# Patient Record
Sex: FEMALE
[De-identification: names, ages, dates, MRNs, and addresses within clinical notes are randomized per-mention and may not be internally consistent; named-entity substitution may affect disease eponyms.]

---

## 2018-07-11 ENCOUNTER — HOSPITAL ENCOUNTER (EMERGENCY)
Dept: HOSPITAL 31 - C.ER | Age: 23
Discharge: HOME | End: 2018-07-11
Payer: COMMERCIAL

## 2018-07-11 VITALS
TEMPERATURE: 98.1 F | HEART RATE: 78 BPM | SYSTOLIC BLOOD PRESSURE: 110 MMHG | OXYGEN SATURATION: 100 % | DIASTOLIC BLOOD PRESSURE: 76 MMHG

## 2018-07-11 VITALS — RESPIRATION RATE: 20 BRPM

## 2018-07-11 DIAGNOSIS — F41.0: Primary | ICD-10-CM

## 2018-07-11 LAB
BILIRUB UR-MCNC: NEGATIVE MG/DL
GLUCOSE UR STRIP-MCNC: NORMAL MG/DL
HCG,QUALITATIVE URINE: NEGATIVE
LEUKOCYTE ESTERASE UR-ACNC: (no result) LEU/UL
PH UR STRIP: 7 [PH] (ref 5–8)
PROT UR STRIP-MCNC: (no result) MG/DL
RBC # UR STRIP: NEGATIVE /UL
SP GR UR STRIP: 1.02 (ref 1–1.03)
SQUAMOUS EPITHIAL: 4 /HPF (ref 0–5)
UROBILINOGEN UR-MCNC: 2 MG/DL (ref 0.2–1)

## 2018-07-11 NOTE — C.PDOC
History Of Present Illness


22 y/o female with history of anxiety presents to ED for evaluation of panic 

attack gradually developed this morning, while at school. Patient reports 

symptoms lasted 1 hour and describes as " my head was feeling up, developed 

chest tightness with palpitations". Patient reports similar symptoms in the 

past but "not as severe" . Pt admits, currently sx moderate improved. Otherwise

, Patient denies  fever,m chills, recent illness, headache, dizziness, vertigo, 

visual changes, focal deficits, neck pain, sob, dyspnea, diaphoresis, abd. pain

, nausea, vomiting, diarrhea, UTI sx, denies drug use. Ambulate to Ed for 

evaluation, appears comfortable, not in any apparent distress.





Time Seen by Provider: 07/11/18 13:44


Chief Complaint (Nursing): Anxiety


History Per: Patient


History/Exam Limitations: no limitations


Onset/Duration Of Symptoms: Hrs


Current Symptoms Are (Timing): Still Present


Suicide/Self Injury Attempted (Context): None





Past Medical History


Reviewed: Historical Data, Nursing Documentation, Vital Signs


Vital Signs: 


 Last Vital Signs











Temp  98.7 F   07/11/18 13:29


 


Pulse  82   07/11/18 13:29


 


Resp  20   07/11/18 13:29


 


BP  97/62 L  07/11/18 13:29


 


Pulse Ox  98   07/11/18 14:59














- Medical History


PMH: Anxiety


Surgical History: No Surg Hx


Family History: States: No Known Family Hx





- Social History


Hx Tobacco Use: No


Hx Alcohol Use: No


Hx Substance Use: No





- Immunization History


Hx Tetanus Toxoid Vaccination: No


Hx Influenza Vaccination: No


Hx Pneumococcal Vaccination: No





Review Of Systems


Except As Marked, All Systems Reviewed And Found Negative.


Constitutional: Negative for: Fever, Chills


Eyes: Negative for: Vision Change


ENT: Negative for: Ear Discharge, Throat Swelling


Cardiovascular: Positive for: Chest Pain, Palpitations.  Negative for: Light 

Headedness


Respiratory: Negative for: Cough, Shortness of Breath


Gastrointestinal: Negative for: Nausea, Vomiting, Abdominal Pain, Diarrhea


Genitourinary: Negative for: Dysuria


Skin: Negative for: Rash


Neurological: Negative for: Weakness, Numbness, Altered Mental Status, Headache

, Dizziness





Physical Exam





- Physical Exam


Appears: Well, Non-toxic, No Acute Distress


Skin: Warm, Dry, No Rash


Head: Normacephalic


Eye(s): bilateral: PERRL


Ear(s): Bilateral: Normal


Nose: No Flaring, No Discharge


Oral Mucosa: Moist, No Drooling


Tongue: Normal Appearing


Lips: Normal Appearing


Throat: No Erythema, No Drooling


Neck: Trachea Midline, Supple


Cardiovascular: Rhythm Regular, No Murmur, No JVD, Other ((-) carotid bruits B/L

)


Respiratory: No Accessory Muscle Use, No Rales, No Rhonchi, No Stridor, No 

Wheezing


Gastrointestinal/Abdominal: Soft, No Tenderness, No Distention, No Guarding, No 

Rebound


Back: No CVA Tenderness


Extremity: Normal ROM, No Pedal Edema, No Deformity, No Swelling


Neurological/Psych: Oriented x3, Normal Speech, Normal Cognition





ED Course And Treatment





- Laboratory Results


Urine Pregnancy POC: Negative


ECG: Interpreted By Me, Viewed By Me


ECG Interpretation: Normal


O2 Sat by Pulse Oximetry: 98 (RA)


Pulse Ox Interpretation: Normal





- Radiology


CXR: Interpreted by Me, Viewed By Me, Read By Radiologist


CXR Interpretation: Yes: No Acute Disease


Progress Note: On re-evaluation, pt is asymptomatic now.  Pt is afebrile, 

hemodynamicaly stable.  Non-toxic.  PulseOx 98% RA.  ENT: no acute findings.  

neck: SUpple, (-) JVD.  Lungs: CTA B/L, BS equal B/L.  CVS: (+)S1S2, reg.  Abd: 

benign, (-) guarding, (-) rebound.  back: (-) CVA tenderness.  Neurologicaly 

intact.  CXR, EKG review- normal study.  UA review- no acute changes.  Pt has 

clinical findings c/w anxiety, panic attack.  Pt advised on course of ds.  ref. 

to f/u with PMD in 2-3 days for re-evaluation.





Disposition


Counseled Patient/Family Regarding: Studies Performed, Diagnosis, Need For 

Followup, Rx Given, Smoking Cessation





- Disposition


Referrals: 


[facility] [Outside]


James Piña MD [Staff Provider] - 


Disposition: HOME/ ROUTINE


Disposition Time: 14:51


Condition: STABLE


Additional Instructions: 


Follow up with PMD, Psychiatrist in 2-3 days for re-evaluation.


return to Ed if any worsening or new changes.


Prescriptions: 


DiphenhydrAMINE [Benadryl] 25 mg PO BID #20 cap


Instructions:  Panic Disorder (DC)


Forms:  [facility] (English)





- Clinical Impression


Clinical Impression: 


 Panic attack








- PA / NP / Resident Statement


MD/DO has reviewed & agrees with the documentation as recorded.





- Scribe Statement


The provider has reviewed the documentation as recorded by the Trayibmona Lopez





All medical record entries made by the Art were at my direction and 

personally dictated by me. I have reviewed the chart and agree that the record 

accurately reflects my personal performance of the history, physical exam, 

medical decision making, and the department course for this patient. I have 

also personally directed, reviewed, and agree with the discharge instructions 

and disposition.

## 2018-07-13 NOTE — CARD
--------------- APPROVED REPORT --------------





Date of service: 07/11/2018



EKG Measurement

Heart [id number]

ND [id number]

[id number]

[id number]

[id number]



<Conclusion>

Normal sinus rhythm with sinus arrhythmia

Normal ECG

## 2018-08-21 ENCOUNTER — HOSPITAL ENCOUNTER (EMERGENCY)
Dept: HOSPITAL 31 - C.ER | Age: 23
Discharge: HOME | End: 2018-08-21
Payer: COMMERCIAL

## 2018-08-21 VITALS — HEART RATE: 67 BPM

## 2018-08-21 VITALS — DIASTOLIC BLOOD PRESSURE: 58 MMHG | RESPIRATION RATE: 20 BRPM | SYSTOLIC BLOOD PRESSURE: 112 MMHG | TEMPERATURE: 98.7 F

## 2018-08-21 DIAGNOSIS — O20.0: Primary | ICD-10-CM

## 2018-08-21 DIAGNOSIS — Z3A.08: ICD-10-CM

## 2018-08-21 DIAGNOSIS — O23.41: ICD-10-CM

## 2018-08-21 LAB
ALBUMIN SERPL-MCNC: 4.8 G/DL (ref 3.5–5)
ALBUMIN/GLOB SERPL: 1.8 {RATIO} (ref 1–2.1)
ALT SERPL-CCNC: 22 U/L (ref 9–52)
AST SERPL-CCNC: 20 U/L (ref 14–36)
BACTERIA #/AREA URNS HPF: (no result) /[HPF]
BASOPHILS # BLD AUTO: 0 K/UL (ref 0–0.2)
BASOPHILS NFR BLD: 0.5 % (ref 0–2)
BILIRUB UR-MCNC: NEGATIVE MG/DL
BUN SERPL-MCNC: 9 MG/DL (ref 7–17)
CALCIUM SERPL-MCNC: 9.8 MG/DL (ref 8.6–10.4)
COLOR UR: YELLOW
EOSINOPHIL # BLD AUTO: 0.1 K/UL (ref 0–0.7)
EOSINOPHIL NFR BLD: 0.7 % (ref 0–4)
ERYTHROCYTE [DISTWIDTH] IN BLOOD BY AUTOMATED COUNT: 12.2 % (ref 11.5–14.5)
GFR NON-AFRICAN AMERICAN: > 60
GLUCOSE UR STRIP-MCNC: NEGATIVE MG/DL
HGB BLD-MCNC: 14.2 G/DL (ref 11–16)
LEUKOCYTE ESTERASE UR-ACNC: NEGATIVE LEU/UL
LIPASE: 90 U/L (ref 23–300)
LYMPHOCYTES # BLD AUTO: 1.6 K/UL (ref 1–4.3)
LYMPHOCYTES NFR BLD AUTO: 18.6 % (ref 20–40)
MCH RBC QN AUTO: 31.2 PG (ref 27–31)
MCHC RBC AUTO-ENTMCNC: 36 G/DL (ref 33–37)
MCV RBC AUTO: 86.8 FL (ref 81–99)
MONOCYTES # BLD: 0.4 K/UL (ref 0–0.8)
MONOCYTES NFR BLD: 5.3 % (ref 0–10)
NEUTROPHILS # BLD: 6.4 K/UL (ref 1.8–7)
NEUTROPHILS NFR BLD AUTO: 74.9 % (ref 50–75)
NRBC BLD AUTO-RTO: 0 % (ref 0–2)
PH UR STRIP: 8 [PH] (ref 5–8)
PLATELET # BLD: 244 K/UL (ref 130–400)
PMV BLD AUTO: 7.8 FL (ref 7.2–11.7)
PROT UR STRIP-MCNC: (no result) MG/DL
RBC # BLD AUTO: 4.55 MIL/UL (ref 3.8–5.2)
RBC # UR STRIP: (no result) /UL
SP GR UR STRIP: 1.01 (ref 1–1.03)
SQUAMOUS EPITHIAL: 6 /HPF (ref 0–5)
UROBILINOGEN UR-MCNC: 0.2 MG/DL (ref 0.2–1)
WBC # BLD AUTO: 8.5 K/UL (ref 4.8–10.8)

## 2018-08-21 PROCEDURE — 83690 ASSAY OF LIPASE: CPT

## 2018-08-21 PROCEDURE — 87086 URINE CULTURE/COLONY COUNT: CPT

## 2018-08-21 PROCEDURE — 99285 EMERGENCY DEPT VISIT HI MDM: CPT

## 2018-08-21 PROCEDURE — 85025 COMPLETE CBC W/AUTO DIFF WBC: CPT

## 2018-08-21 PROCEDURE — 84702 CHORIONIC GONADOTROPIN TEST: CPT

## 2018-08-21 PROCEDURE — 96361 HYDRATE IV INFUSION ADD-ON: CPT

## 2018-08-21 PROCEDURE — 96365 THER/PROPH/DIAG IV INF INIT: CPT

## 2018-08-21 PROCEDURE — 76817 TRANSVAGINAL US OBSTETRIC: CPT

## 2018-08-21 PROCEDURE — 80053 COMPREHEN METABOLIC PANEL: CPT

## 2018-08-21 PROCEDURE — 81001 URINALYSIS AUTO W/SCOPE: CPT

## 2018-08-21 NOTE — US
Pelvic ultrasound 



History: Pregnancy. Pain. 



Comparison: None available. 



Technique: Real-time sonography was performed through the pelvis 

utilizing transvaginal technique. 



Findings: 



Uterus: 9.1 x 4.8 x 6.7 centimeters.  Heterogeneous echotexture.  

Anteverted. 



Cervix measures 3.7 centimeters. 



Intrauterine gestational sac measuring 2.88 centimeters corresponding 

to a gestational age of 7 weeks 5 days. 



Yolk sac measures 3.1 millimeters. 



Crown-rump length measures 1.6 centimeters corresponding to a 

gestational age of 8 weeks 0 days. 



Fetal heart rate of 173 beats per minute. 



No free fluid in the pelvic cul-de-sac. 



Right ovary: 4.0 x 2.1 x 3.2 centimeters. Normal flow. 



Left ovary: 3.7 x 1.4 x 2.0 centimeters. Normal flow. 



Impression: 



Intrauterine pregnancy corresponding to a gestational age of 

approximately 8 weeks and 0 days by crown-rump length of 1.6 

centimeters.  Fetal heart rate of 173 beats per minute. 



Limited 1st trimester ultrasound for viability purposes only.  

Continued interval followup with serial ultrasound, serial HCG levels,

 and gynecological consultation would be helpful if clinically 

indicated.

## 2018-08-21 NOTE — C.PDOC
History Of Present Illness





22 y/o female patient () presents to the ER with c/o lower suprapubic pain 

for the past two weeks. Pain is described as stabbing and intermittent. Patient 

is also experiencing nausea and yellow non-bloody emesis for the past two days. 

She reports being 6 weeks pregnant; LMP 18. Patient denies having a 

sonogram completed by her OB/GYN and has a scheduled follow up with her OBGYN 

on 18. Patient has not followed up with PCP. She denies fever,chills, 

diarrhea, dysuria, vaginal bleeding or any other associated symptoms. 


Time Seen by Provider: 18 11:43


Chief Complaint (Nursing): Female Genitourinary


History Per: Patient


History/Exam Limitations: no limitations


Onset/Duration Of Symptoms: Days, Intermittent Episodes


Current Symptoms Are (Timing): Still Present


Severity: Mild


Pain Scale Rating Of: 3


Location Of Pain/Discomfort: Suprapubic


Radiation Of Pain To:: None


Quality Of Discomfort: Stabbing, "Pain"


Associated Symptoms: Nausea, Vomiting.  denies: Fever, Chills, Diarrhea


Recent travel outside of the [place]: No


Last Menstral Period: 18


: 2


Para: 0


Miscarriage: 1





Past Medical History


Reviewed: Historical Data, Nursing Documentation, Vital Signs


Vital Signs: 


 Last Vital Signs











Temp  98.7 F   18 17:56


 


Pulse  67   18 17:56


 


Resp  20   18 17:56


 


BP  112/58 L  18 17:56


 


Pulse Ox  99   18 17:56














- Medical History


PMH: Anxiety


Surgical History: No Surg Hx


Family History: States: No Known Family Hx





- Social History


Hx Tobacco Use: No


Hx Alcohol Use: No


Hx Substance Use: No





- Immunization History


Hx Tetanus Toxoid Vaccination: No


Hx Influenza Vaccination: No


Hx Pneumococcal Vaccination: No





Review Of Systems


Constitutional: Negative for: Fever, Chills


Gastrointestinal: Positive for: Nausea, Vomiting (Yellow emesis), Abdominal 

Pain (Suprapubic ).  Negative for: Diarrhea


Genitourinary: Negative for: Dysuria, Vaginal Discharge, Vaginal Bleeding





Physical Exam





- Physical Exam


Appears: Non-toxic, No Acute Distress


Skin: Warm, Dry, No Rash


Head: Atraumatic, Normacephalic


Eye(s): bilateral: PERRL, EOMI


Oral Mucosa: Moist


Neck: Supple


Chest: No Tenderness


Cardiovascular: Rhythm Regular, No Murmur


Respiratory: Normal Breath Sounds, No Rales, No Rhonchi, Other (Clear to 

auscultation bilaterally.)


Gastrointestinal/Abdominal: Soft, Tenderness (mild suprapubic tenderness), No 

Distention, Other (Normoactive bowel sounds)


Back: No CVA Tenderness


Extremity: Normal ROM, No Calf Tenderness, No Swelling


Neurological/Psych: Oriented x3, Other (Alert, no gross focal deficit.)





ED Course And Treatment





- Laboratory Results


Result Diagrams: 


 18 12:01





 18 12:01


O2 Sat by Pulse Oximetry: 98 (RA)


Pulse Ox Interpretation: Normal





Medical Decision Making


Medical Decision Making: 








Impression: 23 year old with suprabic abdominal pain associated with 2 days of 

emesis.


Differential Diagnosis included but are not limited to: ectopic pregnancy vs. 

abdominal pain


Plan:


US Transvaginal 


UA


Urine Culture


Bloodwork





Reassess and disposition


Discussed w/ OBGYN on-call Dr. Lynn, recommends giving 2gm ceftriaxone IV and 

discharging patient on PO antibiotics. 





Disposition


Counseled Patient/Family Regarding: Studies Performed, Diagnosis, Need For 

Followup, Rx Given





- Disposition


Referrals: 


[facility] [Outside]


[facility] [Outside]


[facility] [Outside]


Disposition: HOME/ ROUTINE


Disposition Time: 17:34


Condition: GOOD


Additional Instructions: 


Please drink increased fluids. Please take antibiotics until completed.  FOllow 

up with your gynecologist in next 1-2 days. Pelvic rest- nothing in vagina.  

Return to ER for any worse abdominal pain, vaginal bleeding or any other 

concerns. 


Prescriptions: 


Nitrofurantoin Macrocrystals [Macrobid] 100 mg PO BID #14 cap


Instructions:  Urinary Tract Infection, Adult (DC), Threatened Miscarriage (DC)


Forms:  [facility] (English), General Discharge Instructions





- Clinical Impression


Clinical Impression: 


 Threatened , UTI (urinary tract infection)








- PA / NP / Resident Statement


MD/DO has reviewed & agrees with the documentation as recorded.





- Scribe Statement


The provider has reviewed the documentation as recorded by the Scribe (Malick Pride)








All medical record entries made by the Scribe were at my direction and 

personally dictated by me. I have reviewed the chart and agree that the record 

accurately reflects my personal performance of the history, physical exam, 

medical decision making, and the department course for this patient. I have 

also personally directed, reviewed, and agree with the discharge instructions 

and disposition.

## 2018-08-23 VITALS — OXYGEN SATURATION: 98 %

## 2018-12-14 ENCOUNTER — HOSPITAL ENCOUNTER (EMERGENCY)
Dept: HOSPITAL 31 - C.ER | Age: 23
Discharge: HOME | End: 2018-12-14
Payer: MEDICAID

## 2018-12-14 VITALS
RESPIRATION RATE: 20 BRPM | TEMPERATURE: 98.2 F | DIASTOLIC BLOOD PRESSURE: 60 MMHG | SYSTOLIC BLOOD PRESSURE: 100 MMHG | HEART RATE: 68 BPM

## 2018-12-14 VITALS — OXYGEN SATURATION: 98 %

## 2018-12-14 VITALS — BODY MASS INDEX: 22.3 KG/M2

## 2018-12-14 DIAGNOSIS — Z3A.00: ICD-10-CM

## 2018-12-14 DIAGNOSIS — O26.891: Primary | ICD-10-CM

## 2018-12-14 DIAGNOSIS — R05: ICD-10-CM

## 2018-12-14 NOTE — C.PDOC
History Of Present Illness


23 year old female presents to the ED for evaluation of dry cough which began 

around one week ago. She also reports associated chest pain when she coughs. 

Patient has not taken any medication for her symptoms. She denies fever, chills,

shortness of breath. 


Time Seen by Provider: 12/14/18 17:34


Chief Complaint (Nursing): Chest Pain


History Per: Patient


History/Exam Limitations: no limitations


Onset/Duration Of Symptoms: Other (one week )


Current Symptoms Are (Timing): Still Present


Quality: "Pain"


Additional History Per: Patient





Past Medical History


Reviewed: Historical Data, Nursing Documentation, Vital Signs


Vital Signs: 





                                Last Vital Signs











Temp  98.6 F   12/14/18 17:25


 


Pulse  80   12/14/18 17:25


 


Resp  18   12/14/18 17:25


 


BP  115/79   12/14/18 17:25


 


Pulse Ox  98   12/14/18 17:25














- Medical History


PMH: Anxiety


Surgical History: No Surg Hx


Family History: States: Unknown Family Hx





- Social History


Hx Tobacco Use: No


Hx Alcohol Use: No


Hx Substance Use: No





- Immunization History


Hx Tetanus Toxoid Vaccination: No


Hx Influenza Vaccination: No


Hx Pneumococcal Vaccination: No





Review Of Systems


Constitutional: Negative for: Fever, Chills


Cardiovascular: Positive for: Chest Pain


Respiratory: Positive for: Cough.  Negative for: Shortness of Breath, Sputum





Physical Exam





- Physical Exam


Appears: Non-toxic, No Acute Distress


Skin: Normal Color, Warm, Dry


Head: Atraumatic, Normacephalic


Eye(s): bilateral: Normal Inspection


Ear(s): Bilateral: Normal


Nose: Normal, No Discharge


Oral Mucosa: Moist


Throat: Normal, No Erythema, No Exudate


Neck: Supple


Chest: Symmetrical, No Deformity, No Tenderness


Cardiovascular: Rhythm Regular, No Murmur


Respiratory: Normal Breath Sounds, No Rales, No Rhonchi, No Wheezing


Extremity: Normal ROM


Neurological/Psych: Oriented x3, Normal Speech, Normal Cognition





ED Course And Treatment


O2 Sat by Pulse Oximetry: 98 (on RA)


Pulse Ox Interpretation: Normal





- Other Rad


  ** CXR


X-Ray: Viewed By Me, Read By Radiologist


Interpretation: HISTORY:  cough r/o infiltrate.  COMPARISON:  Chest x-ray 

performed 7/11/18.  TECHNIQUE:  Chest PA and lateral.  FINDINGS:  LUNGS:  No 

focal consolidation.  Please note that chest x-ray has limited sensitivity for 

the detection of pulmonary masses.  PLEURA:  No significant pleural effusion 

identified. No definite pneumothorax .  CARDIOVASCULAR:  Heart size appears 

within normal limits.  No atherosclerotic calcification present.  OSSEOUS 

STRUCTURES:  Degenerative changes of the spine.  VISUALIZED UPPER ABDOMEN:  

Unremarkable.  OTHER FINDINGS:  Bilateral nipple rings.  IMPRESSION:  No focal 

consolidation identified.





Medical Decision Making


Medical Decision Making: 





Impression: 23 year old female with dry cough and associated chest pain 


Plan:


   * CXR 


   * POC urine pregnancy test 


   * reassess and disposition 


   


Progress: 


CXR ordered and reviewed. 


Patient has positive pregnancy test. Patient states she was aware of this prior 

to ED arrival, but did not tell the ED staff. 





On reassessment, patient is resting comfortably, showing no signs of distress 

and is stable for discharge. Patient is advised to f/u with her PMD within 1-2 

days for further evaluation. Advised to return to the ED if symptoms persist or 

worsen. 





Disposition





- Disposition


Referrals: 


Khloe Ro, [Non-Staff] - 


Disposition: HOME/ ROUTINE


Disposition Time: 18:10


Condition: GOOD


Additional Instructions: 





MARI MAURO, thank you for letting us take care of you today. The emergency 

medical care you received today was directed at your acute symptoms. If you were

prescribed any medication, please fill it and take as directed. It may take 

several days for your symptoms to resolve. Return to the Emergency Department if

your symptoms worsen, do not improve, or if you have any other problems.





Please contact your doctor or call one of the physicians/clinics you have been 

referred to that are listed on the Patient Visit Information form that is 

included in your discharge packet. Bring any paperwork you were given at 

discharge with you along with any medications you are taking to your follow up 

visit. Our treatment cannot replace ongoing medical care by a primary care 

provider outside of the emergency department.





Thank you for allowing the [facility] team to be part of your care today.











You can take Tylenol for any pain that you have.











Follow up with your OB doctor next week for re-evaluation and further 

management.


Prescriptions: 


Prenatal Vit No.126/Iron/Folic [Classic Prenatal Tablet] 1 each PO DAILY #30 

tablet


Instructions:  Pregnancy Symptoms


Forms:  [employer] (English), School Excuse





- Clinical Impression


Clinical Impression: 


 Pregnancy, Cough








- Scribe Statement


The provider has reviewed the documentation as recorded by the Scribe (Cynthia Alcaraz)


Provider Attestation: 








All medical record entries made by the Scribe were at my direction and 

personally dictated by me. I have reviewed the chart and agree that the record 

accurately reflects my personal performance of the history, physical exam, 

medical decision making, and the department course for this patient. I have also

personally directed, reviewed, and agree with the discharge instructions and 

disposition.

## 2018-12-14 NOTE — RAD
HISTORY:

 cough r/o infiltrate 



COMPARISON:

Chest x-ray performed 7/11/18 



TECHNIQUE:

Chest PA and lateral



FINDINGS:





LUNGS:

No focal consolidation.



Please note that chest x-ray has limited sensitivity for the 

detection of pulmonary masses.



PLEURA:

No significant pleural effusion identified. No definite pneumothorax .



CARDIOVASCULAR:

Heart size appears within normal limits.  No atherosclerotic 

calcification present.



OSSEOUS STRUCTURES:

Degenerative changes of the spine.



VISUALIZED UPPER ABDOMEN:

Unremarkable.



OTHER FINDINGS:

Bilateral nipple rings.



IMPRESSION:

No focal consolidation identified.

## 2018-12-15 NOTE — CARD
--------------- APPROVED REPORT --------------





Date of service: 12/14/2018



EKG Measurement

Heart [id number]

MO [id number]

[id number]

[id number]

[id number]



<Conclusion>

Normal sinus rhythm

Normal ECG

## 2019-03-25 ENCOUNTER — HOSPITAL ENCOUNTER (EMERGENCY)
Dept: HOSPITAL 31 - C.ER | Age: 24
Discharge: HOME | End: 2019-03-25
Payer: COMMERCIAL

## 2019-03-25 VITALS — BODY MASS INDEX: 22.3 KG/M2

## 2019-03-25 VITALS — TEMPERATURE: 98.9 F | HEART RATE: 78 BPM | DIASTOLIC BLOOD PRESSURE: 66 MMHG | SYSTOLIC BLOOD PRESSURE: 105 MMHG

## 2019-03-25 VITALS — OXYGEN SATURATION: 99 %

## 2019-03-25 VITALS — RESPIRATION RATE: 20 BRPM
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
## 2019-03-25 DIAGNOSIS — S00.33XA: Primary | ICD-10-CM
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
## 2024-01-09 NOTE — RAD
----- Message from Gabino Barrera MD sent at 1/9/2024  1:33 PM CST -----  B12 is low causing anemia  Should have B12 injections 1 mg every week for 4 weeks then 1 mg every month after that  Increase the protein in the diet has hypoproteinemia    Check CBC 1 month   HISTORY:



COMPARISON:

No prior.



TECHNIQUE:

Chest PA and lateral



FINDINGS:



LINES AND TUBES:

None. 



LUNG AND PLEURA:

The lungs are well inflated and clear. No pleural effusion or 

pneumothorax.



HEART AND MEDIASTINUM:

The heart is not enlarged. The hilar and mediastinal contours are 

within normal limits.



SKELETAL STRUCTURES:

The bony structures are within normal limits for the patient's age.



VISUALIZED UPPER ABDOMEN:

Normal.



OTHER FINDINGS:

None.



IMPRESSION:

No active pulmonary disease.